# Patient Record
Sex: FEMALE | Race: BLACK OR AFRICAN AMERICAN | Employment: FULL TIME | URBAN - METROPOLITAN AREA
[De-identification: names, ages, dates, MRNs, and addresses within clinical notes are randomized per-mention and may not be internally consistent; named-entity substitution may affect disease eponyms.]

---

## 2022-03-19 PROBLEM — D25.9 FIBROID UTERUS: Status: ACTIVE | Noted: 2019-12-03

## 2022-03-19 PROBLEM — E66.01 OBESITY, MORBID (HCC): Status: ACTIVE | Noted: 2018-08-20

## 2023-04-11 RX ORDER — NORETHINDRONE ACETATE AND ETHINYL ESTRADIOL AND FERROUS FUMARATE 1.5-30(21)
KIT ORAL
Qty: 84 TABLET | OUTPATIENT
Start: 2023-04-11

## 2023-05-16 ENCOUNTER — OFFICE VISIT (OUTPATIENT)
Dept: GYNECOLOGY | Age: 38
End: 2023-05-16
Payer: COMMERCIAL

## 2023-05-16 VITALS
HEIGHT: 66 IN | DIASTOLIC BLOOD PRESSURE: 86 MMHG | SYSTOLIC BLOOD PRESSURE: 124 MMHG | BODY MASS INDEX: 47.09 KG/M2 | WEIGHT: 293 LBS

## 2023-05-16 DIAGNOSIS — N89.8 VAGINAL DISCHARGE: ICD-10-CM

## 2023-05-16 DIAGNOSIS — Z01.419 ENCOUNTER FOR WELL WOMAN EXAM WITH ROUTINE GYNECOLOGICAL EXAM: Primary | ICD-10-CM

## 2023-05-16 DIAGNOSIS — Z12.4 CERVICAL CANCER SCREENING: ICD-10-CM

## 2023-05-16 DIAGNOSIS — R30.0 DYSURIA: ICD-10-CM

## 2023-05-16 DIAGNOSIS — N93.9 ABNORMAL UTERINE BLEEDING (AUB): ICD-10-CM

## 2023-05-16 LAB
BILIRUBIN, URINE, POC: NEGATIVE
BLOOD URINE, POC: NEGATIVE
GLUCOSE URINE, POC: NORMAL
KETONES, URINE, POC: NEGATIVE
LEUKOCYTE ESTERASE, URINE, POC: NEGATIVE
NITRITE, URINE, POC: NEGATIVE
PH, URINE, POC: 7 (ref 4.6–8)
PROTEIN,URINE, POC: NEGATIVE
SPECIFIC GRAVITY, URINE, POC: 1.01 (ref 1–1.03)
URINALYSIS CLARITY, POC: CLEAR
URINALYSIS COLOR, POC: YELLOW
UROBILINOGEN, POC: NORMAL

## 2023-05-16 PROCEDURE — 99395 PREV VISIT EST AGE 18-39: CPT | Performed by: OBSTETRICS & GYNECOLOGY

## 2023-05-16 PROCEDURE — 81003 URINALYSIS AUTO W/O SCOPE: CPT | Performed by: OBSTETRICS & GYNECOLOGY

## 2023-05-16 RX ORDER — NORETHINDRONE ACETATE AND ETHINYL ESTRADIOL 1.5-30(21)
1 KIT ORAL DAILY
Qty: 3 PACKET | Refills: 4 | Status: SHIPPED | OUTPATIENT
Start: 2023-05-16

## 2023-05-16 NOTE — PROGRESS NOTES
MELL Downs is a 44 y.o. female seen for annual GYN exam.  She is having vaginal discharge but it doesn't really have any symptoms. She c/o dysuria    Past Medical History, Past Surgical History, Family history, Social History, and Medications were all reviewed with the patient today and updated as necessary. Current Outpatient Medications   Medication Sig    norethindrone-ethinyl estradiol-iron (MICROGESTIN FE1.5/30) 1.5-30 MG-MCG tablet Take 1 tablet by mouth daily    albuterol sulfate  (90 Base) MCG/ACT inhaler Inhale 2 puffs into the lungs every 6 hours as needed    citalopram (CELEXA) 40 MG tablet TAKE 1 TABLET BY MOUTH ONCE DAILY    ergocalciferol (ERGOCALCIFEROL) 1.25 MG (77217 UT) capsule Take 1 capsule by mouth every 7 days    pantoprazole (PROTONIX) 40 MG tablet Take 1 tablet by mouth daily    valACYclovir (VALTREX) 500 MG tablet TAKE 2 TABLETS BY MOUTH EVERY 12 HOURS FOR ACUTE OPIOID THERAPY DAYS (Patient not taking: Reported on 2023)     No current facility-administered medications for this visit.      No Known Allergies  Past Medical History:   Diagnosis Date    Acquired hypothyroidism     controlled with medication, followed by Dr. Isrrael Crook     Allergic rhinitis     Anxiety and depression     Controlled with medication     Asthma     exercised induced--PRN inhaler, last used beginning of 2019    Chlamydia infection, current pregnancy     Essential hypertension     patient denies     Fibroid     Graves disease     Herpes simplex without mention of complication     no outbreak    History of anemia     Intramural leiomyoma of uterus     LATRICE (obstructive sleep apnea)     No CPAP      Past Surgical History:   Procedure Laterality Date    GYN          MYOMECTOMY  2019    OTHER SURGICAL HISTORY      nasal surgery    TONSILLECTOMY AND ADENOIDECTOMY      WISDOM TOOTH EXTRACTION       Family History   Problem Relation Age of Onset    Hypertension Mother

## 2023-05-20 LAB
A VAGINAE DNA VAG QL NAA+PROBE: NORMAL SCORE
BVAB2 DNA VAG QL NAA+PROBE: NORMAL SCORE
C ALBICANS DNA VAG QL NAA+PROBE: NEGATIVE
C GLABRATA DNA VAG QL NAA+PROBE: NEGATIVE
MEGA1 DNA VAG QL NAA+PROBE: NORMAL SCORE
SPECIMEN SOURCE: NORMAL

## 2023-05-22 LAB
A VAGINAE DNA VAG QL NAA+PROBE: NORMAL SCORE
BVAB2 DNA VAG QL NAA+PROBE: NORMAL SCORE
C ALBICANS DNA VAG QL NAA+PROBE: NEGATIVE
C GLABRATA DNA VAG QL NAA+PROBE: NEGATIVE
MEGA1 DNA VAG QL NAA+PROBE: NORMAL SCORE
SPECIMEN SOURCE: NORMAL
T VAGINALIS RRNA SPEC QL NAA+PROBE: NEGATIVE

## 2023-06-03 LAB
C TRACH RRNA CVX QL NAA+PROBE: NEGATIVE
CYTOLOGIST CVX/VAG CYTO: ABNORMAL
CYTOLOGY CVX/VAG DOC THIN PREP: ABNORMAL
HPV APTIMA: POSITIVE
HPV REFLEX: ABNORMAL
Lab: ABNORMAL
N GONORRHOEA RRNA CVX QL NAA+PROBE: NEGATIVE
PATH REPORT.FINAL DX SPEC: ABNORMAL
PATHOLOGIST CVX/VAG CYTO: ABNORMAL
PATHOLOGIST PROVIDED ICD: ABNORMAL
RECOM F/U CVX/VAG CYTO: ABNORMAL
STAT OF ADQ CVX/VAG CYTO-IMP: ABNORMAL

## 2024-04-05 ENCOUNTER — PROCEDURE VISIT (OUTPATIENT)
Dept: NEUROLOGY | Age: 39
End: 2024-04-05

## 2024-04-05 VITALS — WEIGHT: 293 LBS | HEIGHT: 65 IN | HEART RATE: 80 BPM | OXYGEN SATURATION: 98 % | BODY MASS INDEX: 48.82 KG/M2

## 2024-04-05 DIAGNOSIS — M79.641 BILATERAL HAND PAIN: Primary | ICD-10-CM

## 2024-04-05 DIAGNOSIS — M79.642 BILATERAL HAND PAIN: Primary | ICD-10-CM

## 2024-04-05 NOTE — PROGRESS NOTES
EMG/Nerve Conduction Study Procedure Note  [address]    [address]  [place]  [address]   [phone]      Hx:    Exam:     38 y.o.   BAA female   bilat UE  EMG  -- hand pains. Paresthesias..  no Tinel.  No Villa.  No Caitlin.  No Spurling.  No Lhermitte sign.  No atrophy.  Referring: Dr. Deanna Garnica  //NP Chey Bhakta  Technologist: Dominick Walsh  Height: 5 foot 5 inch BMI 52.59       Summary           needle EMG select limited hand muscle as below.  With CV.  Upper.           Controlled environmental factors / EMG lab.  Temperature.   NCV : sensory segments:    Normal bilateral median ulnar and radial SCV.  NCV transcarpal sensory segments:    Normal bilateral transcarpal median and transcarpal ulnar SCV.  NCV Motor MCV segments:     Normal bilateral median bilateral ulnar MCV.  F-wave studies:         Normal bilateral median bilateral ulnar F-wave studies (a bit borderline prolonged at the right median)   NEEDLE EMG:   Tested muscles::    Normal bilateral APB muscles  Normal insertional activity and interference pattern/recruitment.  No fasciculations fibrillations positive sharp waves.  Normal MUP.  No BSS AP.  No giant MUP.  No myotonia.  No upper motor neuron sign.          INTERPRETATION:             INTERPRETATION:       NORMAL STUDIES.     THERE IS NO ELECTROPHYSIOLOGIC EVIDENCE OF NEUROPATHY, DENERVATION, RADICULOPATHY, PLEXOPATHY, MYOPATHY, MYOTONIA, OR FASCICULATIONS IN THE TESTED SEGMENTS  OF  UPPER   EXTREMITIES.            CONCLUSION:      NORMAL UPPER EXTREMITY STUDIES.        Procedure Details:       FURTHER clinical correlation is recommended and warranted as studies are within normal limits.                                                             Patient is made aware.              Please Note::     Data and waveforms * filed under Procedure category [facility].    See Procedure Files for complete data pages.       [ *NOTE:  parts or all of this consultation are produced

## 2024-06-08 DIAGNOSIS — N93.9 ABNORMAL UTERINE BLEEDING (AUB): ICD-10-CM

## 2024-06-10 RX ORDER — NORETHINDRONE ACETATE AND ETHINYL ESTRADIOL 1.5-30(21)
1 KIT ORAL DAILY
Qty: 1 PACKET | Refills: 0 | Status: SHIPPED | OUTPATIENT
Start: 2024-06-10

## 2024-07-08 DIAGNOSIS — N93.9 ABNORMAL UTERINE BLEEDING (AUB): ICD-10-CM

## 2024-07-08 RX ORDER — NORETHINDRONE ACETATE AND ETHINYL ESTRADIOL 1.5-30(21)
1 KIT ORAL DAILY
Qty: 1 PACKET | Refills: 0 | Status: SHIPPED | OUTPATIENT
Start: 2024-07-08

## 2024-07-08 NOTE — TELEPHONE ENCOUNTER
Pt called for a refill on her birth control pills. Appointment made. New prescription to the pharmacy.

## 2024-07-09 NOTE — PROGRESS NOTES
HPI    Celeste Duarte is a 38 y.o. female seen for annual GYN exam.    Past Medical History, Past Surgical History, Family history, Social History, and Medications were all reviewed with the patient today and updated as necessary.     Current Outpatient Medications   Medication Sig    buPROPion (WELLBUTRIN) 75 MG tablet Take 1 tablet by mouth daily    polyethylene glycol (GLYCOLAX) 17 GM/SCOOP powder MIX 1 SCOOP WITH 8 OUNCES OF FLUID ONCE DAILY    norethindrone-ethinyl estradiol-iron (MICROGESTIN FE1.5/30) 1.5-30 MG-MCG tablet Take 1 tablet by mouth daily    albuterol sulfate  (90 Base) MCG/ACT inhaler Inhale 2 puffs into the lungs every 6 hours as needed    citalopram (CELEXA) 40 MG tablet TAKE 1 TABLET BY MOUTH ONCE DAILY    ergocalciferol (ERGOCALCIFEROL) 1.25 MG (30438 UT) capsule Take 1 capsule by mouth every 7 days    pantoprazole (PROTONIX) 40 MG tablet Take 1 tablet by mouth daily     No current facility-administered medications for this visit.     Allergies   Allergen Reactions    Adhesive Tape Rash     Pt's skin excoriated after EKG patches in place for short or long period of time     Past Medical History:   Diagnosis Date    Abnormal Pap smear of cervix         Acquired hypothyroidism     controlled with medication, followed by Dr. King     Allergic rhinitis     Anxiety and depression     Controlled with medication     Asthma     exercised induced--PRN inhaler, last used beginning of 2019    Chlamydia infection, current pregnancy     Essential hypertension     patient denies     Fibroid     Graves disease     Herpes simplex without mention of complication     no outbreak    History of anemia     Intramural leiomyoma of uterus     LATRICE (obstructive sleep apnea)     No CPAP      Past Surgical History:   Procedure Laterality Date    GYN          MYOMECTOMY  2019    OTHER SURGICAL HISTORY      nasal surgery    TONSILLECTOMY AND ADENOIDECTOMY      WISDOM TOOTH

## 2024-07-15 ENCOUNTER — OFFICE VISIT (OUTPATIENT)
Dept: OBGYN CLINIC | Age: 39
End: 2024-07-15
Payer: COMMERCIAL

## 2024-07-15 VITALS
DIASTOLIC BLOOD PRESSURE: 88 MMHG | BODY MASS INDEX: 48.82 KG/M2 | SYSTOLIC BLOOD PRESSURE: 130 MMHG | HEIGHT: 65 IN | WEIGHT: 293 LBS

## 2024-07-15 DIAGNOSIS — Z01.419 ENCOUNTER FOR WELL WOMAN EXAM WITH ROUTINE GYNECOLOGICAL EXAM: Primary | ICD-10-CM

## 2024-07-15 DIAGNOSIS — Z12.31 SCREENING MAMMOGRAM, ENCOUNTER FOR: ICD-10-CM

## 2024-07-15 DIAGNOSIS — Z12.4 CERVICAL CANCER SCREENING: ICD-10-CM

## 2024-07-15 DIAGNOSIS — N93.9 ABNORMAL UTERINE BLEEDING (AUB): ICD-10-CM

## 2024-07-15 PROCEDURE — 99395 PREV VISIT EST AGE 18-39: CPT | Performed by: OBSTETRICS & GYNECOLOGY

## 2024-07-15 PROCEDURE — 99459 PELVIC EXAMINATION: CPT | Performed by: OBSTETRICS & GYNECOLOGY

## 2024-07-15 RX ORDER — NORETHINDRONE ACETATE AND ETHINYL ESTRADIOL 1.5-30(21)
1 KIT ORAL DAILY
Qty: 1 PACKET | Refills: 12 | Status: SHIPPED | OUTPATIENT
Start: 2024-07-15

## 2024-07-15 RX ORDER — BUPROPION HYDROCHLORIDE 75 MG/1
75 TABLET ORAL DAILY
COMMUNITY
Start: 2024-07-07

## 2024-07-15 RX ORDER — POLYETHYLENE GLYCOL 3350 17 G/17G
POWDER, FOR SOLUTION ORAL
COMMUNITY
Start: 2024-07-08

## 2024-07-15 NOTE — PROGRESS NOTES
Physical Exam   General   Mental Status - Alert. General Appearance - Cooperative.     Abdomen   Inspection: - Inspection Normal.   Palpation/Percussion: Palpation and Percussion of the abdomen reveal - Non Tender, No Rebound tenderness, No Rigidity (guarding), No hepatosplenomegaly, No Palpable abdominal masses and Soft.   Auscultation: Auscultation of the abdomen reveals - Bowel sounds normal.     Female Genitourinary     External Genitalia   Vulva: - Normal. Perineum - Normal. Bartholin's Gland - Bilateral - Normal. Clitoris - Normal.   Introitus: Characteristics - Normal.   Urethra: Characteristics - Normal.     Speculum & Bimanual   Vagina: Vaginal Mucosa - Normal.   Vaginal Wall: - Normal.   Vaginal Lesions - None.   Cervix: Characteristics -  Surgically absent  Uterus: Characteristics -Surgically absent  Adnexa: - Normal.   Bladder - Normal.       Lymphatics  No cervical, axillary or groin adenopathy      Physical Exam   General   Mental Status - Alert. General Appearance - Cooperative.     Abdomen   Inspection: - Inspection Normal.   Palpation/Percussion: Palpation and Percussion of the abdomen reveal - Non Tender, No Rebound tenderness, No Rigidity (guarding), No hepatosplenomegaly, No Palpable abdominal masses and Soft.   Auscultation: Auscultation of the abdomen reveals - Bowel sounds normal.     Female Genitourinary     External Genitalia   Vulva: - Normal. Perineum - Normal. Bartholin's Gland - Bilateral - Normal. Clitoris - Normal.   Introitus: Characteristics - Normal.   Urethra: Characteristics - Normal.     Speculum & Bimanual   Vagina: Vaginal Mucosa - Normal.   Vaginal Wall: - Normal.   Vaginal Lesions - None.   Cervix: Characteristics -  Surgically absent  Uterus: Characteristics -Surgically absent  Adnexa: - Normal.   Bladder - Normal.       Lymphatics  No cervical, axillary or groin adenopathy      Physical Exam   General   Mental Status - Alert. General Appearance - Cooperative.     Abdomen

## 2024-07-16 LAB
COLLECTION METHOD: NORMAL
CYTOLOGIST CVX/VAG CYTO: NORMAL
CYTOLOGY CVX/VAG DOC THIN PREP: NORMAL
DATE OF LMP: NORMAL
HPV REFLEX: NORMAL
Lab: NORMAL
OTHER PT INFO: NORMAL
PAP SOURCE: NORMAL
PATH REPORT.FINAL DX SPEC: NORMAL
PREV CYTO INFO: NORMAL
PREV TREATMENT: NORMAL
STAT OF ADQ CVX/VAG CYTO-IMP: NORMAL

## 2025-01-13 ENCOUNTER — HOSPITAL ENCOUNTER (OUTPATIENT)
Dept: MAMMOGRAPHY | Age: 40
Discharge: HOME | End: 2025-01-16
Attending: OBSTETRICS & GYNECOLOGY
Payer: COMMERCIAL

## 2025-01-13 VITALS — HEIGHT: 66 IN | BODY MASS INDEX: 47.09 KG/M2 | WEIGHT: 293 LBS

## 2025-01-13 DIAGNOSIS — Z12.31 SCREENING MAMMOGRAM, ENCOUNTER FOR: ICD-10-CM

## 2025-01-13 PROCEDURE — 77063 BREAST TOMOSYNTHESIS BI: CPT

## 2025-08-20 DIAGNOSIS — N93.9 ABNORMAL UTERINE BLEEDING (AUB): ICD-10-CM

## 2025-08-20 RX ORDER — NORETHINDRONE ACETATE AND ETHINYL ESTRADIOL 1.5-30(21)
1 KIT ORAL DAILY
Qty: 1 PACKET | Refills: 2 | Status: SHIPPED | OUTPATIENT
Start: 2025-08-20